# Patient Record
Sex: MALE | NOT HISPANIC OR LATINO | URBAN - METROPOLITAN AREA
[De-identification: names, ages, dates, MRNs, and addresses within clinical notes are randomized per-mention and may not be internally consistent; named-entity substitution may affect disease eponyms.]

---
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
## 2021-07-07 ENCOUNTER — IMPORTED ENCOUNTER (OUTPATIENT)
Dept: URBAN - METROPOLITAN AREA CLINIC 1 | Facility: CLINIC | Age: 21
End: 2021-07-07

## 2021-07-07 PROBLEM — H52.223: Noted: 2021-07-07

## 2021-07-07 PROBLEM — H52.13: Noted: 2021-07-07

## 2021-07-07 PROCEDURE — S0621 ROUTINE OPHTHALMOLOGICAL EXA: HCPCS

## 2021-07-07 NOTE — PATIENT DISCUSSION
1. Myopia w/ Astigmatism OU -- Rx was given for correction if indicated and requested. 2. Recurrent Acute Allergic Conjunctivitis OU -- Quiet today. Cont Zaditor BID OU PRN itching. May use Pataday QD OU in place of Zaditor (Coupon given). Cont ATs BID-QID OU routinely. 3.  Central Corneal Scar OD -- Stable. Observe. Finalized CTL Rx and given to patient (Biotrue 1 Day). Change made to OS lens and new trials given. Return for an appointment in 1 year 40/cc with Dr. Eliu tSroud.

## 2022-04-02 ASSESSMENT — VISUAL ACUITY
OD_SC: 20/20
OS_SC: 20/20
OD_SC: 20/25
OS_SC: 20/20
OS_SC: 20/20
OD_SC: 20/20
OS_SC: 20/20
OD_SC: 20/20-2

## 2022-04-02 ASSESSMENT — TONOMETRY
OS_IOP_MMHG: 12
OD_IOP_MMHG: 11
OS_IOP_MMHG: 16
OD_IOP_MMHG: 16
OS_IOP_MMHG: 11
OD_IOP_MMHG: 12
OD_IOP_MMHG: 12
OS_IOP_MMHG: 11

## 2022-07-08 ENCOUNTER — COMPREHENSIVE EXAM (OUTPATIENT)
Dept: URBAN - METROPOLITAN AREA CLINIC 1 | Facility: CLINIC | Age: 22
End: 2022-07-08

## 2022-07-08 DIAGNOSIS — H52.13: ICD-10-CM

## 2022-07-08 DIAGNOSIS — H10.45: ICD-10-CM

## 2022-07-08 DIAGNOSIS — H52.223: ICD-10-CM

## 2022-07-08 PROCEDURE — 92014 COMPRE OPH EXAM EST PT 1/>: CPT

## 2022-07-08 ASSESSMENT — VISUAL ACUITY
OS_CC: 20/20
OD_CC: 20/20-2

## 2022-07-08 ASSESSMENT — TONOMETRY
OD_IOP_MMHG: 13
OS_IOP_MMHG: 14

## 2023-07-07 ENCOUNTER — COMPREHENSIVE EXAM (OUTPATIENT)
Dept: URBAN - METROPOLITAN AREA CLINIC 1 | Facility: CLINIC | Age: 23
End: 2023-07-07

## 2023-07-07 DIAGNOSIS — H52.223: ICD-10-CM

## 2023-07-07 DIAGNOSIS — H52.13: ICD-10-CM

## 2023-07-07 PROCEDURE — 92014 COMPRE OPH EXAM EST PT 1/>: CPT

## 2023-07-07 PROCEDURE — 92015 DETERMINE REFRACTIVE STATE: CPT

## 2023-07-07 PROCEDURE — 92310-2 LEVEL 2 CONTACT LENS MANAGEMENT

## 2023-07-07 ASSESSMENT — VISUAL ACUITY
OD_CC: 20/20
OS_CC: J1+
OD_CC: J1+
OS_CC: 20/20

## 2023-07-07 ASSESSMENT — TONOMETRY
OS_IOP_MMHG: 14
OD_IOP_MMHG: 14

## 2024-08-21 ENCOUNTER — COMPREHENSIVE EXAM (OUTPATIENT)
Dept: URBAN - METROPOLITAN AREA CLINIC 1 | Facility: CLINIC | Age: 24
End: 2024-08-21

## 2024-08-21 DIAGNOSIS — H52.13: ICD-10-CM

## 2024-08-21 DIAGNOSIS — Z01.00: ICD-10-CM

## 2024-08-21 DIAGNOSIS — Z46.0: ICD-10-CM

## 2024-08-21 DIAGNOSIS — H52.223: ICD-10-CM

## 2024-08-21 PROCEDURE — 92015 DETERMINE REFRACTIVE STATE: CPT

## 2024-08-21 PROCEDURE — 92014 COMPRE OPH EXAM EST PT 1/>: CPT

## 2024-08-21 ASSESSMENT — VISUAL ACUITY
OS_CC: 20/20
OS_CC: J1+
OD_CC: 20/20
OD_CC: J1+

## 2024-08-21 ASSESSMENT — TONOMETRY
OD_IOP_MMHG: 14
OS_IOP_MMHG: 14